# Patient Record
Sex: MALE | Race: OTHER | NOT HISPANIC OR LATINO | Employment: OTHER | URBAN - METROPOLITAN AREA
[De-identification: names, ages, dates, MRNs, and addresses within clinical notes are randomized per-mention and may not be internally consistent; named-entity substitution may affect disease eponyms.]

---

## 2021-11-13 ENCOUNTER — HOSPITAL ENCOUNTER (EMERGENCY)
Facility: MEDICAL CENTER | Age: 31
End: 2021-11-21
Attending: EMERGENCY MEDICINE
Payer: COMMERCIAL

## 2021-11-13 DIAGNOSIS — R46.89 AGGRESSIVE BEHAVIOR: ICD-10-CM

## 2021-11-13 DIAGNOSIS — F84.0 AUTISM: ICD-10-CM

## 2021-11-13 DIAGNOSIS — F20.9 CHRONIC SCHIZOPHRENIA (HCC): ICD-10-CM

## 2021-11-13 PROCEDURE — A9270 NON-COVERED ITEM OR SERVICE: HCPCS | Performed by: EMERGENCY MEDICINE

## 2021-11-13 PROCEDURE — 700102 HCHG RX REV CODE 250 W/ 637 OVERRIDE(OP): Performed by: EMERGENCY MEDICINE

## 2021-11-13 PROCEDURE — 96372 THER/PROPH/DIAG INJ SC/IM: CPT

## 2021-11-13 PROCEDURE — 99285 EMERGENCY DEPT VISIT HI MDM: CPT

## 2021-11-13 PROCEDURE — 302970 POC BREATHALIZER: Performed by: EMERGENCY MEDICINE

## 2021-11-13 PROCEDURE — 700111 HCHG RX REV CODE 636 W/ 250 OVERRIDE (IP): Performed by: EMERGENCY MEDICINE

## 2021-11-13 RX ORDER — ZIPRASIDONE HYDROCHLORIDE 80 MG/1
80 CAPSULE ORAL 2 TIMES DAILY
COMMUNITY

## 2021-11-13 RX ORDER — RISPERIDONE 2 MG/1
2 TABLET ORAL 2 TIMES DAILY
Status: DISCONTINUED | OUTPATIENT
Start: 2021-11-13 | End: 2021-11-13

## 2021-11-13 RX ORDER — ALPRAZOLAM 0.25 MG/1
0.25 TABLET ORAL PRN
Status: DISCONTINUED | OUTPATIENT
Start: 2021-11-14 | End: 2021-11-21 | Stop reason: HOSPADM

## 2021-11-13 RX ORDER — HALOPERIDOL 5 MG/ML
5 INJECTION INTRAMUSCULAR ONCE
Status: COMPLETED | OUTPATIENT
Start: 2021-11-13 | End: 2021-11-13

## 2021-11-13 RX ORDER — ALPRAZOLAM 0.25 MG/1
0.25 TABLET ORAL PRN
COMMUNITY

## 2021-11-13 RX ORDER — ZIPRASIDONE HYDROCHLORIDE 40 MG/1
80 CAPSULE ORAL 2 TIMES DAILY
Status: DISCONTINUED | OUTPATIENT
Start: 2021-11-13 | End: 2021-11-21 | Stop reason: HOSPADM

## 2021-11-13 RX ORDER — CLONAZEPAM 0.5 MG/1
1 TABLET ORAL
Status: DISCONTINUED | OUTPATIENT
Start: 2021-11-13 | End: 2021-11-13

## 2021-11-13 RX ORDER — BENZTROPINE MESYLATE 1 MG/1
1 TABLET ORAL 2 TIMES DAILY
Status: DISCONTINUED | OUTPATIENT
Start: 2021-11-13 | End: 2021-11-21 | Stop reason: HOSPADM

## 2021-11-13 RX ORDER — DIPHENHYDRAMINE HYDROCHLORIDE 50 MG/ML
50 INJECTION INTRAMUSCULAR; INTRAVENOUS ONCE
Status: COMPLETED | OUTPATIENT
Start: 2021-11-13 | End: 2021-11-13

## 2021-11-13 RX ORDER — DOCUSATE SODIUM 100 MG/1
100 CAPSULE, LIQUID FILLED ORAL DAILY
Status: DISCONTINUED | OUTPATIENT
Start: 2021-11-14 | End: 2021-11-21 | Stop reason: HOSPADM

## 2021-11-13 RX ORDER — CLONAZEPAM 0.5 MG/1
1 TABLET ORAL
Status: COMPLETED | OUTPATIENT
Start: 2021-11-13 | End: 2021-11-13

## 2021-11-13 RX ORDER — DOCUSATE SODIUM 100 MG/1
100 CAPSULE, LIQUID FILLED ORAL DAILY
COMMUNITY

## 2021-11-13 RX ORDER — BENZTROPINE MESYLATE 1 MG/1
1 TABLET ORAL 2 TIMES DAILY
COMMUNITY

## 2021-11-13 RX ORDER — HYDROXYZINE PAMOATE 50 MG/1
50 CAPSULE ORAL EVERY 8 HOURS PRN
Status: DISCONTINUED | OUTPATIENT
Start: 2021-11-13 | End: 2021-11-13

## 2021-11-13 RX ORDER — LORAZEPAM 2 MG/ML
2 INJECTION INTRAMUSCULAR ONCE
Status: COMPLETED | OUTPATIENT
Start: 2021-11-13 | End: 2021-11-13

## 2021-11-13 RX ADMIN — LORAZEPAM 2 MG: 2 INJECTION INTRAMUSCULAR; INTRAVENOUS at 14:56

## 2021-11-13 RX ADMIN — ZIPRASIDONE HYDROCHLORIDE 80 MG: 40 CAPSULE ORAL at 21:05

## 2021-11-13 RX ADMIN — HALOPERIDOL LACTATE 5 MG: 5 INJECTION, SOLUTION INTRAMUSCULAR at 14:59

## 2021-11-13 RX ADMIN — BENZTROPINE MESYLATE 1 MG: 1 TABLET ORAL at 23:29

## 2021-11-13 RX ADMIN — DIPHENHYDRAMINE HYDROCHLORIDE 50 MG: 50 INJECTION INTRAMUSCULAR; INTRAVENOUS at 14:59

## 2021-11-13 RX ADMIN — CLONAZEPAM 1 MG: 0.5 TABLET ORAL at 21:05
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
## 2021-11-14 LAB
AMPHET UR QL SCN: NEGATIVE
BARBITURATES UR QL SCN: NEGATIVE
BENZODIAZ UR QL SCN: POSITIVE
BZE UR QL SCN: NEGATIVE
CANNABINOIDS UR QL SCN: NEGATIVE
FLUAV RNA SPEC QL NAA+PROBE: NEGATIVE
FLUBV RNA SPEC QL NAA+PROBE: NEGATIVE
METHADONE UR QL SCN: NEGATIVE
OPIATES UR QL SCN: NEGATIVE
OXYCODONE UR QL SCN: NEGATIVE
PCP UR QL SCN: NEGATIVE
PROPOXYPH UR QL SCN: NEGATIVE
RSV RNA SPEC QL NAA+PROBE: NEGATIVE
SARS-COV-2 RNA RESP QL NAA+PROBE: NOTDETECTED
SPECIMEN SOURCE: NORMAL

## 2021-11-14 PROCEDURE — 80307 DRUG TEST PRSMV CHEM ANLYZR: CPT

## 2021-11-14 PROCEDURE — 0241U HCHG SARS-COV-2 COVID-19 NFCT DS RESP RNA 4 TRGT MIC: CPT

## 2021-11-14 PROCEDURE — A9270 NON-COVERED ITEM OR SERVICE: HCPCS | Performed by: EMERGENCY MEDICINE

## 2021-11-14 PROCEDURE — C9803 HOPD COVID-19 SPEC COLLECT: HCPCS | Performed by: EMERGENCY MEDICINE

## 2021-11-14 PROCEDURE — 700102 HCHG RX REV CODE 250 W/ 637 OVERRIDE(OP): Performed by: EMERGENCY MEDICINE

## 2021-11-14 RX ADMIN — DOCUSATE SODIUM 100 MG: 100 CAPSULE, LIQUID FILLED ORAL at 09:22

## 2021-11-14 RX ADMIN — ZIPRASIDONE HYDROCHLORIDE 80 MG: 40 CAPSULE ORAL at 09:22

## 2021-11-14 RX ADMIN — BENZTROPINE MESYLATE 1 MG: 1 TABLET ORAL at 09:23

## 2021-11-14 RX ADMIN — BENZTROPINE MESYLATE 1 MG: 1 TABLET ORAL at 21:43

## 2021-11-14 RX ADMIN — ZIPRASIDONE HYDROCHLORIDE 80 MG: 40 CAPSULE ORAL at 18:07

## 2021-11-14 NOTE — ED NOTES
Patient's home medications have been reviewed by the pharmacy team.     Past Medical History:   Diagnosis Date   • Mental retardation    • Psychiatric disorder    • Schizophrenia (HCC)        Patient's Medications   New Prescriptions    No medications on file   Previous Medications    ALPRAZOLAM (XANAX) 0.25 MG TAB    Take 0.25 mg by mouth as needed for Sleep or Anxiety.    BENZTROPINE (COGENTIN) 1 MG TAB    Take 1 mg by mouth 2 times a day.    DOCUSATE SODIUM (COLACE) 100 MG CAP    Take 100 mg by mouth every day.    ZIPRASIDONE (GEODON) 80 MG CAP    Take 80 mg by mouth 2 times a day.   Modified Medications    No medications on file   Discontinued Medications    CLONAZEPAM (KLONOPIN) 1 MG TABS    Take 1 mg by mouth every bedtime.    HYDROXYZINE (VISTARIL) 50 MG CAPS    Take 50 mg by mouth. Take daily prn for anxiety & agitation     NON FORMULARY REQUEST    Dipherhy gram 25mg take one every morning     RISPERIDONE (RISPERDAL) 2 MG TABS    Take 1 Tab by mouth 2 Times a Day.    RISPERIDONE (RISPERDAL) 2 MG TABS    Take 2 mg by mouth 2 times a day.    RISPERIDONE (RISPERDAL) 3 MG TABS    Take 3 mg by mouth 2 times a day.          A:  Medications do not appear to be contributing to current complaints.         P:    No recommendations at this time. Home medications have been reordered as appropriate.        Steffany Rodríguez, KatarzynaD
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
## 2021-11-14 NOTE — ED NOTES
Pt continues to sleep. Visible rise and fall of chest observed, no acute distress. Pt still has not eaten meal tray.

## 2021-11-14 NOTE — ED NOTES
Pt given hot lunch tray. Pt sat up briefly but rolled over and closed eyes again. Pt did not eat his bfast tray.

## 2021-11-14 NOTE — DISCHARGE PLANNING
Alert Team:    Attempted to obtain collateral information form mother; LVM for Raine Quintero at [phone]. Patient resting and difficult to rouse. Requested Covid swab from ERP; required for psychiatric transfer to [facility]. Alert Team will continue to monitor.

## 2021-11-14 NOTE — ED NOTES
Assumed care of pt. Pt sleeps unless disturbed. Alert team at BS but pt states he isn't ready to answer questions.

## 2021-11-14 NOTE — DISCHARGE PLANNING
Alert team note: Patient unable to answer assessment questions at this time. I will attempt to reassess at a later time.

## 2021-11-14 NOTE — ED NOTES
"Med Rec complete per Pt's mother via the phone.  Allergies reviewed.  No oral ABX in the last 14 days.    Pts mother states she was unable to get Pt to take his medications for about \"a couple days or so\"  "

## 2021-11-15 PROCEDURE — 700102 HCHG RX REV CODE 250 W/ 637 OVERRIDE(OP): Performed by: EMERGENCY MEDICINE

## 2021-11-15 PROCEDURE — A9270 NON-COVERED ITEM OR SERVICE: HCPCS | Performed by: EMERGENCY MEDICINE

## 2021-11-15 RX ADMIN — BENZTROPINE MESYLATE 1 MG: 1 TABLET ORAL at 10:04

## 2021-11-15 RX ADMIN — DOCUSATE SODIUM 100 MG: 100 CAPSULE, LIQUID FILLED ORAL at 07:01

## 2021-11-15 RX ADMIN — ZIPRASIDONE HYDROCHLORIDE 80 MG: 40 CAPSULE ORAL at 18:59

## 2021-11-15 RX ADMIN — ZIPRASIDONE HYDROCHLORIDE 80 MG: 40 CAPSULE ORAL at 07:01

## 2021-11-15 RX ADMIN — BENZTROPINE MESYLATE 1 MG: 1 TABLET ORAL at 21:06

## 2021-11-15 ASSESSMENT — LIFESTYLE VARIABLES
DOES PATIENT WANT TO STOP DRINKING: NO
DO YOU DRINK ALCOHOL: NO

## 2021-11-15 NOTE — ED NOTES
Morning medication out of stock in ED, message sent to pharmacy.   Pt resting on gurney, no distress noted, repositions self side to side. No current needs.

## 2021-11-15 NOTE — ED NOTES
Pt medicated per MAR. Pt refusing vitals at this time. Pt denies any current needs. Pt encouraged to eat, states does not want to at this time. Pt closed eyes, no distress noted.
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
## 2021-11-15 NOTE — PROGRESS NOTES
"    ED Observation Progress Note    Date of Service: 11/15/21    Interval History  No events.  Patient has been calm and cooperative.  Patient denies any medical complaints.  Denies fevers, illness, pain    Physical Exam  /79   Pulse 90   Temp 36.6 °C (97.9 °F) (Temporal)   Resp 18   Ht 1.778 m (5' 10\")   Wt 61.2 kg (135 lb)   SpO2 99%   BMI 19.37 kg/m² .    Constitutional: Awake and alert. Nontoxic  HENT:  Grossly normal  Eyes: Grossly normal  Neck: Normal range of motion  Cardiovascular: Normal heart rate   Thorax & Lungs: No respiratory distress  Abdomen: Nontender  Skin:  No pathologic rash.   Extremities: Well perfused      Labs  Results for orders placed or performed during the hospital encounter of 11/13/21   URINE DRUG SCREEN   Result Value Ref Range    Amphetamines Urine Negative Negative    Barbiturates Negative Negative    Benzodiazepines Positive (A) Negative    Cocaine Metabolite Negative Negative    Methadone Negative Negative    Opiates Negative Negative    Oxycodone Negative Negative    Phencyclidine -Pcp Negative Negative    Propoxyphene Negative Negative    Cannabinoid Metab Negative Negative   COV-2, FLU A/B, AND RSV BY PCR (2-4 HOURS CEPHEID): Collect NP swab in VTM    Specimen: Respirate   Result Value Ref Range    Influenza virus A RNA Negative Negative    Influenza virus B, PCR Negative Negative    RSV, PCR Negative Negative    SARS-CoV-2 by PCR NotDetected     SARS-CoV-2 Source NP Swab        Problem List  1.  Schizophrenia-continue with plan per psychiatry  2.  Aggressive behavior      Electronically signed by: Swapnil Francisco M.D., 11/15/2021 12:25 PM    "

## 2021-11-15 NOTE — ED NOTES
Pt found sitting EOB, urinated in urinal but incontinent of stool. Pt cleaned up, amb to sink to wash hands. Pt medicated as ordered.

## 2021-11-15 NOTE — ED NOTES
Assumed care, report received from Yuniel RN, POC discussed.  Introduced self as RN, call light within reach, no s/s of distress noted. Pt denies any current needs. Meal tray at bedside. Pt has not eaten any at this time.

## 2021-11-16 PROCEDURE — A9270 NON-COVERED ITEM OR SERVICE: HCPCS | Performed by: EMERGENCY MEDICINE

## 2021-11-16 PROCEDURE — 700102 HCHG RX REV CODE 250 W/ 637 OVERRIDE(OP): Performed by: EMERGENCY MEDICINE

## 2021-11-16 RX ORDER — OLANZAPINE 5 MG/1
5 TABLET ORAL EVERY EVENING
Status: DISCONTINUED | OUTPATIENT
Start: 2021-11-16 | End: 2021-11-16

## 2021-11-16 RX ORDER — OLANZAPINE 5 MG/1
5 TABLET ORAL EVERY EVENING
Status: DISCONTINUED | OUTPATIENT
Start: 2021-11-16 | End: 2021-11-21 | Stop reason: HOSPADM

## 2021-11-16 RX ADMIN — BENZTROPINE MESYLATE 1 MG: 1 TABLET ORAL at 11:23

## 2021-11-16 RX ADMIN — ZIPRASIDONE HYDROCHLORIDE 80 MG: 40 CAPSULE ORAL at 18:52

## 2021-11-16 RX ADMIN — ALPRAZOLAM 0.25 MG: 0.25 TABLET ORAL at 18:52

## 2021-11-16 RX ADMIN — OLANZAPINE 5 MG: 5 TABLET, FILM COATED ORAL at 13:45

## 2021-11-16 RX ADMIN — ZIPRASIDONE HYDROCHLORIDE 80 MG: 40 CAPSULE ORAL at 06:11

## 2021-11-16 RX ADMIN — BENZTROPINE MESYLATE 1 MG: 1 TABLET ORAL at 21:36

## 2021-11-16 NOTE — ED NOTES
Pt awake, alert, pt ambulates around room. Pt medicated per MAR, meal tray delivered and positioned for patient consumption at this time. Pt takes medication without difficulty. Patient agreeable to putting hospital gown on at this time with slight hesitation, remains cooperative. Noted incontinence of stool and urine around room, dried feces and urine to floor/counters, cart, linens. EVS called, security called for patient shower. Pt states agreeable to showering when offered. Patient repositoins self on cart on clean linens, dirty linens removed, pt intermittently pulling at blankets/sheets off bed. Pt provided with warm blankets, cooperative, calm, pleasant at this time. Pt redirectable at this time, positioned with HOB elevated to consume meal, TV turned on and changed to channel for patient at this time. ERP at bedside.
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
## 2021-11-16 NOTE — DISCHARGE PLANNING
Received Verified Involuntary Court Ordered Petition from the court. Scanned copy of Petition into pt's chart, sent copy to ABDULAZIZ Fine.

## 2021-11-16 NOTE — ED NOTES
Pt resting on gurney with unlabored even chest rise and fall, awakes easily to stimuli, Q15 checks in place, will continue to monitor.

## 2021-11-16 NOTE — ED NOTES
Patient is resting comfortably, remains positioned on side on cart for comfort. Visualized with unlabored respirs, lights dimmed.

## 2021-11-16 NOTE — ED NOTES
Patient is resting comfortably, positions self on cart for comfort, blankets in place. Lights dimmed, resting with unlabored respirs in no distress.

## 2021-11-16 NOTE — ED NOTES
This RN assumes care of patient. Report received from YANN GARNICA. Patient visualized ambulating around room with steady, upright gait. Pt continues removing hospital gown. Remains in room at this time. q15 observation initiated by this RN.

## 2021-11-16 NOTE — ED NOTES
Pt urinated on the floor. Bedside commode brought to room with instructions for Pt to use commode.
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
## 2021-11-16 NOTE — DISCHARGE PLANNING
Alert Team:    Patient resting throughout the night without incident; Q 15 checks in place; no PRN medications requested or required during PM shift; legal hold extended.    Spoke with Drea at [facility] regarding pt referral; still pending at this time.    Alert Team will continue to mnitor.

## 2021-11-16 NOTE — ED NOTES
Report received from YANN Shannon. Pt resting in [facility], no additional needs at this time. L2K in chart

## 2021-11-16 NOTE — ED NOTES
Pt is refusing to cooperate and shower. Pt is covered in feces and urine, Psych at bedside with ERP orders given.

## 2021-11-16 NOTE — DISCHARGE PLANNING
Legal Hold     Referral: Legal Hold Court     Intervention: Pt presented for legal hold meeting with  via video conferencing to discuss legal options of contesting hold and meeting with the court doctors tomorrow afternoon, or not contesting legal hold and continuing the hold for one week.  advised that pt's legal hold will be be continued for one week (11/24).       Plan: Pt's legal hold has been continued for one week. Pt awaiting transfer to an in patient psych facility.

## 2021-11-16 NOTE — ED NOTES
"Security and Tech at bedside with this RN, attempt to coax Pt into wheel chair for shower. PT yelling \"I am okay I am okay\". Pt refusing to be placed in wheel chair for shower. Unable to shower Pt. Will attempt bed bath.   "

## 2021-11-16 NOTE — ED NOTES
Patient is resting comfortably, continues sleeping on cart, repositions self. Call light within reach, resting with unlabored respirs. Pt denies needs or questions.

## 2021-11-17 LAB — POC BREATHALIZER: 0 PERCENT (ref 0–0.01)

## 2021-11-17 PROCEDURE — A9270 NON-COVERED ITEM OR SERVICE: HCPCS | Performed by: EMERGENCY MEDICINE

## 2021-11-17 PROCEDURE — 700102 HCHG RX REV CODE 250 W/ 637 OVERRIDE(OP): Performed by: EMERGENCY MEDICINE

## 2021-11-17 RX ADMIN — ZIPRASIDONE HYDROCHLORIDE 80 MG: 40 CAPSULE ORAL at 19:10

## 2021-11-17 RX ADMIN — ZIPRASIDONE HYDROCHLORIDE 80 MG: 40 CAPSULE ORAL at 06:26

## 2021-11-17 RX ADMIN — BENZTROPINE MESYLATE 1 MG: 1 TABLET ORAL at 08:50

## 2021-11-17 RX ADMIN — OLANZAPINE 5 MG: 5 TABLET, FILM COATED ORAL at 19:10

## 2021-11-17 RX ADMIN — BENZTROPINE MESYLATE 1 MG: 1 TABLET ORAL at 22:00

## 2021-11-17 RX ADMIN — DOCUSATE SODIUM 100 MG: 100 CAPSULE, LIQUID FILLED ORAL at 06:26

## 2021-11-17 NOTE — PROGRESS NOTES
"ED Provider Progress Note    ED Observation Progress Note    Date of Service: 11/17/21    Interval History  No new acute complaints.  Nursing staff states patient has been resting well.  Patient remains on psychiatric hold for evaluation of schizophrenia, aggressive behavior at home which is autism may be contributing according to the note.      Physical Exam  /63   Pulse 70   Temp 36.3 °C (97.4 °F) (Temporal)   Resp 15   Ht 1.778 m (5' 10\")   Wt 61.2 kg (135 lb)   SpO2 95%   BMI 19.37 kg/m² .    Constitutional: Awake and alert. Nontoxic  HENT:  Grossly normal  Eyes: Grossly normal  Neck: Normal range of motion  Cardiovascular: Normal heart rate   Thorax & Lungs: No respiratory distress  Abdomen: Nontender  Skin:  No pathologic rash.   Extremities: Well perfused  Psychiatric: Affect normal    Labs  Results for orders placed or performed during the hospital encounter of 11/13/21   URINE DRUG SCREEN   Result Value Ref Range    Amphetamines Urine Negative Negative    Barbiturates Negative Negative    Benzodiazepines Positive (A) Negative    Cocaine Metabolite Negative Negative    Methadone Negative Negative    Opiates Negative Negative    Oxycodone Negative Negative    Phencyclidine -Pcp Negative Negative    Propoxyphene Negative Negative    Cannabinoid Metab Negative Negative   COV-2, FLU A/B, AND RSV BY PCR (2-4 HOURS CEPHEID): Collect NP swab in VTM    Specimen: Respirate   Result Value Ref Range    Influenza virus A RNA Negative Negative    Influenza virus B, PCR Negative Negative    RSV, PCR Negative Negative    SARS-CoV-2 by PCR NotDetected     SARS-CoV-2 Source NP Swab    POC BREATHALIZER   Result Value Ref Range    POC Breathalizer 0 0.00 - 0.01 Percent       Radiology  No orders to display       Problem List  1. schizophrenia: Ongoing assessment by psychiatry, medication adjustment as needed  2.  Autism with aggressive behavior.  Nursing reports no new change in behavior over the last 24 hours, " patient calm and cooperative at this time      Electronically signed by: Reuebn Becker M.D., 11/17/2021 1:12 PM
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
## 2021-11-17 NOTE — ED NOTES
Pt resting comfortably. Respirations even and unlabored. All needs met at this time. q15 minute observation maintained.

## 2021-11-17 NOTE — ED NOTES
"Pt up to BR, unable to hold urine to make it to BR, back to bed. Attempted to change pt's gown, pt refused, states \"I'm tired, I don't want it, I'm tired.\"  "

## 2021-11-18 PROCEDURE — A9270 NON-COVERED ITEM OR SERVICE: HCPCS | Performed by: EMERGENCY MEDICINE

## 2021-11-18 PROCEDURE — 700102 HCHG RX REV CODE 250 W/ 637 OVERRIDE(OP): Performed by: EMERGENCY MEDICINE

## 2021-11-18 RX ADMIN — ZIPRASIDONE HYDROCHLORIDE 80 MG: 40 CAPSULE ORAL at 18:31

## 2021-11-18 RX ADMIN — BENZTROPINE MESYLATE 1 MG: 1 TABLET ORAL at 09:27

## 2021-11-18 RX ADMIN — DOCUSATE SODIUM 100 MG: 100 CAPSULE, LIQUID FILLED ORAL at 06:24

## 2021-11-18 RX ADMIN — OLANZAPINE 5 MG: 5 TABLET, FILM COATED ORAL at 18:30

## 2021-11-18 RX ADMIN — BENZTROPINE MESYLATE 1 MG: 1 TABLET ORAL at 21:00

## 2021-11-18 RX ADMIN — ZIPRASIDONE HYDROCHLORIDE 80 MG: 40 CAPSULE ORAL at 06:24

## 2021-11-18 RX ADMIN — ALPRAZOLAM 0.25 MG: 0.25 TABLET ORAL at 20:19

## 2021-11-18 NOTE — ED NOTES
Pt asleep on gurney, no acute distress, respirations WNL. Q15 min checks in place. No current needs identified.

## 2021-11-18 NOTE — ED NOTES
Pt asleep on gurney, no acute distress, respirations WNL. No current needs. Q15 min checks in place.

## 2021-11-18 NOTE — ED NOTES
Pt remains in room and resting - calm and cooperative. Needs were assessed and pt denies any additional needs at this time

## 2021-11-18 NOTE — ED NOTES
Pt awoke, ambulating in room, appeared to want to go to the bathroom. Offered to show patient to the bathroom. Pt unwilling. Commode also in room for patient. Attempted to talk to patient, however he lays back into bed and covers himself with blankets. Pt remains on Q15 monitoring.

## 2021-11-18 NOTE — ED NOTES
"Pt awake, ambulating in room, asked if patient needed to use bathroom, pt says \"no\". Pt back to bed. No other needs identified.  "

## 2021-11-18 NOTE — DISCHARGE PLANNING
MSW spoke to Sho at [facility]. They are still full and will let MSW if they have discharges later today.

## 2021-11-18 NOTE — DISCHARGE PLANNING
Received Stipulation and Order from the court continuing pt's legal hold until 11/24. Sent copy to ABDULAZIZ Cuellar, scanned copy into pt's chart.

## 2021-11-18 NOTE — ED NOTES
Morning medications given. Vitals assessed. Pt calm and cooperative. No acute distress. Urinal and commode emptied.

## 2021-11-18 NOTE — ED NOTES
Pt asleep on gurney, no acute distress, respirations WNL. Pt appears to have used urinal. Approx 400ml in urinal.
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
## 2021-11-18 NOTE — ED NOTES
Assumed care, report received from Debra RN, POC discussed.  Introduced self as RN, no s/s of distress noted. Pt non-verbal, will answer to simple yes and no questions. Pt denies any current needs. Meal tray at bedside.

## 2021-11-18 NOTE — ED NOTES
Pt medicated per MAR. Pt ate 90% of evening meal. Pt resting on gurney, no distress noted. No current needs identified.

## 2021-11-18 NOTE — DISCHARGE PLANNING
Monica called [facility] and was informed that they do not have this Pt's referral.  MONICA re faxed Pt referral to [facility].
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
## 2021-11-19 PROCEDURE — A9270 NON-COVERED ITEM OR SERVICE: HCPCS | Performed by: EMERGENCY MEDICINE

## 2021-11-19 PROCEDURE — 700102 HCHG RX REV CODE 250 W/ 637 OVERRIDE(OP): Performed by: EMERGENCY MEDICINE

## 2021-11-19 RX ADMIN — ZIPRASIDONE HYDROCHLORIDE 80 MG: 40 CAPSULE ORAL at 06:05

## 2021-11-19 RX ADMIN — OLANZAPINE 5 MG: 5 TABLET, FILM COATED ORAL at 19:53

## 2021-11-19 RX ADMIN — ZIPRASIDONE HYDROCHLORIDE 80 MG: 40 CAPSULE ORAL at 19:53

## 2021-11-19 RX ADMIN — BENZTROPINE MESYLATE 1 MG: 1 TABLET ORAL at 09:13

## 2021-11-19 RX ADMIN — BENZTROPINE MESYLATE 1 MG: 1 TABLET ORAL at 19:53

## 2021-11-19 NOTE — ED NOTES
Patient jumped out of bed walked the zhou without clothes, RN noticed Feces and urine all over patient. RN gave patient a full bed bath with help of another RN and  tech. RN cleaned pt's gurney, and new linens and gown provided. RN provided a new bed for patient, due to feces sticking on the mattress. Patient provided therapeutic communication, pt was cooperative and provided new socks. RN educated pt to call for assistance if needing to go to the bathroom, pt verbalized understanding. Warm blankets provided.
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
## 2021-11-19 NOTE — DISCHARGE PLANNING
"ALERT team  note:  21 year old male admitted 11/13/21, legal hold, inability to care for self; Medicaid FFS insurance plan; pt evaluated by [facility] psychiatrist Dr. Williamson today, legal hold DC'd; writer RN reviewed community MH resources with  pt, with written information given, including [facility], [facility] , [facility], and [facility] transportation included in pt's insurance plan; writer RN to send pt referral to [facility]; writer RN called pt's mother, Raine, [phone], and reviewed pt DC plan with mother; mother repeating \"the nurse told me you would find him placement\"; writer RN reviewed with mother pt has received MH stabilization treatment in the ED for 5 days as there are no acute inpt MH beds available in the community and the ED does not find or provide long term housing or placement for pts; pt to DC to self today to [facility] outpt services with transport by [facility] at 1415; pt to receive RX's for Alprazolam, geodon, and Cogentin at DC  "

## 2021-11-19 NOTE — ED NOTES
Break RN: [facility] representative arrived to  patient, patient refused to go with [facility], patients mother called and notified, patient mother just screamed and yelled at this RN on the phone for 10 minutes, unable to get straight answer from mother about picking patient up, mother informed that patient is refusing to go to [facility] and has been discharged.

## 2021-11-19 NOTE — ED NOTES
Assume care of patient, report given by Olivia LERNER. Pt is on a legal hold, q15min checks in place

## 2021-11-19 NOTE — ED NOTES
Report received, rounded on patient, no s.s of major distress, precautions in place, sitter in place.  Will monitor.
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
## 2021-11-19 NOTE — DISCHARGE SUMMARY
"  ED Observation Discharge Summary    Patient:Jaime Quintero  Patient : 1990  Patient MRN: [id number]  Patient PCP: Margaret Villafuerte D.O. (Inactive)    Admit Date: 2021  Discharge Date and Time: 21 9:08 AM  Discharge Diagnosis:   1. Aggressive behavior    2. Chronic schizophrenia (HCC)    3. Autism        Discharge Attending: Chester Fish M.D.  Discharge Service: ED Observation    ED Course  Jaime is a 31 y.o. male who was evaluated at [facility] for aggressive behavior.  The patient has a history of autism and schizophrenia.  Was more aggressive with mother.  Patient is done well in the emergency department.  Seen by psychiatry today.  Valier to be back to baseline.  No further acute interventions required.  Discharge instructions and follow-up as per the alert team and psychiatry.    Discharge Exam:  /64   Pulse 69   Temp 36.3 °C (97.4 °F) (Temporal)   Resp 15   Ht 1.778 m (5' 10\")   Wt 61.2 kg (135 lb)   SpO2 97%   BMI 19.37 kg/m² .    Constitutional: Awake and alert. Nontoxic  HENT:  Grossly normal  Eyes: Grossly normal  Neck: Normal range of motion  Cardiovascular: Normal heart rate   Thorax & Lungs: No respiratory distress  Abdomen: Nontender  Skin:  No pathologic rash.   Extremities: Well perfused  Psychiatric: At baseline.  Calm and cooperative.  Labs  Results for orders placed or performed during the hospital encounter of 21   URINE DRUG SCREEN   Result Value Ref Range    Amphetamines Urine Negative Negative    Barbiturates Negative Negative    Benzodiazepines Positive (A) Negative    Cocaine Metabolite Negative Negative    Methadone Negative Negative    Opiates Negative Negative    Oxycodone Negative Negative    Phencyclidine -Pcp Negative Negative    Propoxyphene Negative Negative    Cannabinoid Metab Negative Negative   COV-2, FLU A/B, AND RSV BY PCR (2-4 HOURS CEPHEID): Collect NP swab in VTM    Specimen: Respirate   Result Value Ref Range "    Influenza virus A RNA Negative Negative    Influenza virus B, PCR Negative Negative    RSV, PCR Negative Negative    SARS-CoV-2 by PCR NotDetected     SARS-CoV-2 Source NP Swab    POC BREATHALIZER   Result Value Ref Range    POC Breathalizer 0 0.00 - 0.01 Percent       Radiology  No orders to display       Disposition:   1. Aggressive behavior    2. Chronic schizophrenia (HCC)    3. Autism          Follow up: No follow-ups on file.    Medications:   New Prescriptions    No medications on file       Discharge Condition: Stable    Electronically signed by: Chester Fish M.D., 11/19/2021 9:08 AM

## 2021-11-20 PROCEDURE — 700102 HCHG RX REV CODE 250 W/ 637 OVERRIDE(OP): Performed by: EMERGENCY MEDICINE

## 2021-11-20 PROCEDURE — A9270 NON-COVERED ITEM OR SERVICE: HCPCS | Performed by: EMERGENCY MEDICINE

## 2021-11-20 RX ADMIN — ZIPRASIDONE HYDROCHLORIDE 80 MG: 40 CAPSULE ORAL at 18:09

## 2021-11-20 RX ADMIN — DOCUSATE SODIUM 100 MG: 100 CAPSULE, LIQUID FILLED ORAL at 06:00

## 2021-11-20 RX ADMIN — BENZTROPINE MESYLATE 1 MG: 1 TABLET ORAL at 20:17

## 2021-11-20 RX ADMIN — OLANZAPINE 5 MG: 5 TABLET, FILM COATED ORAL at 18:09

## 2021-11-20 RX ADMIN — ZIPRASIDONE HYDROCHLORIDE 80 MG: 40 CAPSULE ORAL at 06:00

## 2021-11-20 RX ADMIN — BENZTROPINE MESYLATE 1 MG: 1 TABLET ORAL at 09:09
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
## 2021-11-20 NOTE — DISCHARGE PLANNING
MSW called and spoke to pt's mother Raine regarding plan this morning. Pt's mother kept reiterating to MSW that she cannot take pt home. MSW explained to pt's mother multiple times that we cannot keep pt in the ER. Pt's mother can reach out to the community agencies for support, placement and care giving services. Pt's mother agreed with MSW to receive pt tomorrow afternoon. Pt's mother needs to get some additional support for tomorrow in case pt comes home and is violent. MSW explained for pt's mother to come up with a safety plan and to call 911 if pt becomes violent. Pt's mother lives at [address] [place] [address].     MSW to provide resource packet to send home with pt. MSW to update AM SW tomorrow for follow-up. SW to arrange transport for tomorrow afternoon home.
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
## 2021-11-20 NOTE — DISCHARGE PLANNING
note:   assisting.   Attempted to call mother but no answer.   CM called [facility] to possibly talk to the SW who is following but as per Alyssia , this pt is not their client.

## 2021-11-20 NOTE — ED NOTES
Pt sleeping in gurney, blanket pulled over pt's head. Pt declines lights to be dimmed. All needs met at this time.

## 2021-11-20 NOTE — ED NOTES
Pt provided with mathew zambrano, pt laying in bed eating, no signs of distress noted at this time

## 2021-11-20 NOTE — ED NOTES
Pt refused for vitals to be taken. Pt resting comfortably in gurney, blanket remains pulled over head.

## 2021-11-20 NOTE — DISCHARGE PLANNING
ER Ansley MILLER Escalated patient to on-call supervisor.   ANGELA supervisor attempted to contact mother several times, no response, left message and provided ER number.   ANGELA supervisor completed extensive chart review from 10+ years ago, patient appears to have gone in and out of behavioral health group homes, possibly through [facility]. Father of patient has brought him in at times, no numbers or information on father.     Contacted [facility] adult services, no on call person this weekend.     Contacted , Manager, Paola MANE Discussed case.     Followed up with Ansley CINTRON. If we are unable to confirm home address, patient is not able to be discharged due to autistic disability. Will  need more case management to determine safe discharge.     At this time patient is not clear for discharge per hospital care management as we do not have a adequate adult who is able to take patient.

## 2021-11-20 NOTE — DISCHARGE PLANNING
"ANGELA asked to assist after Pt refused to go to [facility].  ANGELA attempted to call Pt's Mother several times however she did not answer.      ANGELA asked to assist with getting patient home as he is medically cleared and ready to discharge.  ANGELA met with Pt bedside to confirm address however Pt would just answer \"nope\" when asked if he knew his address and then would say \"tired.\" ANGELA placed call to [facility] to set up cab and was told that they have the Pt's address as [address], [place], [place].  Per chart review Pt's Mother's address is [address] as well.    ANGELA called [facility] and attempted to obtain the address of the call that brought the Pt in on 11/13/21 however [facility] was unable to locate the call.     ANGELA attempted to call Pt's Mother again however she did not answer. ANGELA spoke with ANGELA Hemphill and she was in agreement with Pt staying until more concrete discharge plan.   "

## 2021-11-20 NOTE — ED NOTES
Pt resting comfortably in bed, NAD. Pt continues to cover head with blanket, declines to have lights turned off. All needs met at this time.

## 2021-11-20 NOTE — ED NOTES
Assumed care, report received from RN, POC discussed.   Introduced self as RN, pt denies SI at this time, reports he slept well. Pt updated on awaiting placement.  NAD.

## 2021-11-20 NOTE — ED NOTES
Pt walked around room and back to bed, no signs of distress noted at this time, will continue to monitor

## 2021-11-20 NOTE — ED NOTES
Have left text messages and voice messages for this pt multiple times with no response from pts mother. Will cont to call pts mother for updated information in order to send pt to home address.
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
## 2021-11-20 NOTE — ED NOTES
Pt got out of bed and passed urine on the floor, nurse tried to direct pt to the restroom, pt stated he was ok and did not want to go, pt got back in bed and pulled the blanket up over his head, floor cleaned and moped

## 2021-11-20 NOTE — ED NOTES
Pt medicated with ordered meds, pt was asked if he needs to use the restroom or if he is hungry, pt states he is ok

## 2021-11-21 VITALS
OXYGEN SATURATION: 96 % | BODY MASS INDEX: 19.33 KG/M2 | TEMPERATURE: 97.6 F | DIASTOLIC BLOOD PRESSURE: 87 MMHG | HEART RATE: 101 BPM | WEIGHT: 135 LBS | RESPIRATION RATE: 20 BRPM | HEIGHT: 70 IN | SYSTOLIC BLOOD PRESSURE: 131 MMHG

## 2021-11-21 PROCEDURE — 700102 HCHG RX REV CODE 250 W/ 637 OVERRIDE(OP): Performed by: EMERGENCY MEDICINE

## 2021-11-21 PROCEDURE — A9270 NON-COVERED ITEM OR SERVICE: HCPCS | Performed by: EMERGENCY MEDICINE

## 2021-11-21 RX ADMIN — BENZTROPINE MESYLATE 1 MG: 1 TABLET ORAL at 09:18

## 2021-11-21 RX ADMIN — ZIPRASIDONE HYDROCHLORIDE 80 MG: 40 CAPSULE ORAL at 09:18

## 2021-11-21 NOTE — ED NOTES
Patient resting in bed with eyes closed; respirations regular. 1-1 sitter at doorway. Awaiting transfer.  Will continue to monitor.

## 2021-11-21 NOTE — DISCHARGE PLANNING
Transportation has been set with [facility] via taxi. Transport set for 2 pm via MTM. Attempted to call mom to confirm pt coming home, no answer and not able to leave a voicemail. Will continue to attempt to contact pt's mom Raine.
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
## 2021-11-21 NOTE — ED NOTES
Patient resting in bed. 1-1 sitter at doorway. Awaiting transfer   Updated Pt on plan of care. Pt verbalized understanding.  Medicated Pt according to MAR.  Will continue to monitor.

## 2021-11-21 NOTE — ED NOTES
Patient resting in bed with eyes closed. 1-1 sitter at doorway. Awaiting transfer.  Will continue to monitor.

## 2021-11-21 NOTE — ED NOTES
Pt medicated according to MAR, all needs met at this time. Pt given new arm band, however pt refused to have old armband removed. Pt resting comfortably in bed.
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
## 2021-11-21 NOTE — ED NOTES
Patient resting in bed with eyes closed. Respirations regular. 1-1 sitter at doorway. Awaiting transfer.  Will continue to monitor.

## 2021-11-21 NOTE — ED NOTES
Pts mother called and states she is running late at will be here at approx 1430. She asked to speak to MSW. Call transferred.

## 2021-11-21 NOTE — ED NOTES
Pt mother came to  him. She made multiple statements that she was expecting more help. She thought he would be admitted to an inpatient psych facility. Educated that he was evaluated and that at this time the psychiatrist did not feel he needed to be admitted to an in patient hospital. All the resources provided by MSW discussed and pt's mother educated that she should start contacting those resources tomorrow AM. She again stated she thought she would have received more assistance. All DC instructions with patients mother at [place].
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
## 2025-06-19 NOTE — DISCHARGE PLANNING
Copied from CRM #[id number]. Topic: MW Medication/Rx - MW Rx Refill  >> Jun 19, 2025 10:07 AM Shereen DAILEY MA wrote:  --DO NOT REPLY - Sent from PACT - If sent to wrong pool, reroute to P [facility] Reroute pool --    Message Type:  Refill Medication   Is the medication pended:Yes and no   Medication name: Alcohol Swabs Pads and new glucose meter that you do not have to prick your finger at all.   Message: needs refilled  Preferred pharmacy verified, and selected.     Call Back #: [phone]  Can a detailed message be left?  Yes - Voicemail   Is the patient OUT of Medication?  Yes: Working Hours: route as HIGH priority according to KB. Patient has been advised the message will be reviewed within 1 business day   Medical Social Work    Referral: Legal Hold    Intervention: Legal Hold Paperwork given to SW by [employer] RN: Connie  Pt was seen by Psychiatry on 11/14/2021 at 1256.     Legal Hold Initiated: Date: 11/13/2021  Time: 1430    Legal Hold faxed: Date: 11/14/2021  Time: 2240    Patient’s Insurance Listed on Face Sheet: Medicaid FFS    Referrals sent to: [facility], [facility] and [facility]    Plan: Patient will transfer to mental health facility once acceptance is obtained.